# Patient Record
Sex: MALE
[De-identification: names, ages, dates, MRNs, and addresses within clinical notes are randomized per-mention and may not be internally consistent; named-entity substitution may affect disease eponyms.]

---

## 2021-01-23 ENCOUNTER — HOSPITAL ENCOUNTER (OUTPATIENT)
Dept: HOSPITAL 62 - EMPHEALTH | Age: 41
End: 2021-01-23
Attending: INTERNAL MEDICINE
Payer: SELF-PAY

## 2021-01-23 DIAGNOSIS — Z23: Primary | ICD-10-CM

## 2021-01-23 PROCEDURE — 91300: CPT

## 2021-01-26 NOTE — XMS REPORT
Patient Summary Document

                           Created on:2021



Patient:AMANDA BOJORQUEZ

Sex:Male

:1980

External Reference #:[id number]





Demographics







                          Address                   [address] [place] [address]

 

                          Home Phone                [phone]

 

                          Work Phone                [phone]

 

                          Email Address             [email or web address]

 

                          Preferred Language        English

 

                          Marital Status            Unknown

 

                          Yazdanism Affiliation     Unknown

 

                          Race                      Unknown

 

                          Additional Race(s)        [id number]

 

                          Ethnic Group              Unknown









Author







                          Organization              [facility]

 

                          Address                   [facility] [address] [place] [id number]

 

                          Phone                     [phone]









Care Team Providers







                    Name                Role                Phone

 

                    MALDONADO, NIA   Primary Care Physician Unavailable









Allergies, Adverse Reactions, Alerts

This patient has no known allergies or adverse reactions.



Medications







       Ordered Filled Start  Stop   Current Ordering Indication Dosage Frequency

 Signature

                          Comments                  Components



      Medication Medication Date  Date  Medication? Clinician                   

(SIG)       



      Name  Name                                                        

 

      lisinopriL              Yes         Hypertensio 10mg        Take 1 T

jade 1 



      (PRINIVIL,Z       8-19                    n,                tablet (10 tab

let 



      ESTRIL) 10       00:00:                   unspecified             mg total

) (10 mg 



      MG tablet       00                      type              by mouth total) 

by 



                                                            daily. mouth 



                                                                  daily. 

 

      lisinopril       2020- No          Hypertensio 10mg        Take 1 T

jade 1 



      (PRINIVIL,Z       8-29  08-19             n,                tablet (10 tab

let 



      ESTRIL) 10       00:00: 00:00             unspecified             mg total

) (10 mg 



      MG tablet       00    :00               type              by mouth total) 

by 



                                                            daily. mouth 



                                                                  daily. 

 

      lisinopril        2019- No                10mg        Take 10 mg Keegan

e 10 



      (PRINIVIL,Z       8-16  08-29                               by mouth mg by

 



      ESTRIL) 10       15:20: 00:00                               daily. mouth 



      MG tablet       35    :00                                       daily. 

 

      HYDROcodone       - 2017- No                1{tbl} Q4H   Take 1-2 Keegan

e 1-2 



      -acetaminop       1-19  02-18                               tablets by tab

lets 



      hen (NORCO)       00:00: 23:59                               mouth by mout

h 



      5-325 mg       00    :00                                 every four every 



      per tablet                                                 (4) hours four 

(4) 



                                                            as needed hours as 



                                                            for pain. needed 



                                                                  for pain. 

 

      HYDROcodone       - 2017- No                1{tbl} Q4H   Take 1-2 Keegan

e 1-2 



      -acetaminop       1-19  02-18                               tablets by tab

lets 



      hen (NORCO)       00:00: 23:59                               mouth by mout

h 



      5-325 mg       00    :00                                 every four every 



      per tablet                                                 (4) hours four 

(4) 



                                                            as needed hours as 



                                                            for pain. needed 



                                                                  for pain. 

 

      HYDROcodone       2017- No                1{tbl} Q4H   Take 1-2 Keegan

e 1-2 



      -acetaminop                                      tablets by tab

lets 



      hen (NORCO)       00:00: 23:59                               mouth by mout

h 



      5-325 mg       00    :00                                 every four every 



      per tablet                                                 (4) hours four 

(4) 



                                                            as needed hours as 



                                                            for pain. needed 



                                                                  for pain. 

 

      sulfamethox       2017- No                160mg       Take 1 Take 1

 



      azole-trime                          {trimet       tablet table

t 



      thoprim       00:00: 23:59                   hoprim}       (160 mg of (160

 mg 



      (BACTRIM       00    :00                                 trimethopr of    



      DS) 800-160                                                 im total) trim

ethop 



      mg per                                                 by mouth rim   



      tablet                                                 Two (2) total) by 



                                                            times a mouth Two 



                                                            day. for (2) times 



                                                            10 days a day. 



                                                                  for 10 



                                                                  days  

 

      sulfamethox       2017- No                160mg       Take 1 Take 1

 



      azole-trime                          {trimet       tablet table

t 



      thoprim       00:00: 23:59                   hoprim}       (160 mg of (160

 mg 



      (BACTRIM       00    :00                                 trimethopr of    



      DS) 800-160                                                 im total) trim

ethop 



      mg per                                                 by mouth rim   



      tablet                                                 Two (2) total) by 



                                                            times a mouth Two 



                                                            day. for (2) times 



                                                            10 days a day. 



                                                                  for 10 



                                                                  days  

 

      sulfamethox       2017- No                160mg       Take 1 Take 1

 



      azole-trime                          {trimet       tablet table

t 



      thoprim       00:00: 23:59                   hoprim}       (160 mg of (160

 mg 



      (BACTRIM       00    :00                                 trimethopr of    



      DS) 800-160                                                 im total) trim

ethop 



      mg per                                                 by mouth rim   



      tablet                                                 Two (2) total) by 



                                                            times a mouth Two 



                                                            day. for (2) times 



                                                            10 days a day. 



                                                                  for 10 



                                                                  days  







Problems







        Condition Condition Condition Status  Onset   Resolution Last    Treatin

g Comments



        Name    Details Category         Date    Date    Treatment Clinician 



                                                        Date            

 

        No known No known [id number]                                         



        active  active                                                  



        problems problems                                                 

 

        Essential Essential [id number] Active                  2019         



        hypertensio hypertensio                                 10:56:59        

 



        n       n                                                       







Procedures







                Procedure       Date / Time Performed Performing Clinician Devic e

 

                COMPREHENSIVE METABOLIC PANEL 2020 13:21:00 Tom Maldonado

ine 

 

                LIPID PANEL     2020 13:21:00 Nia Maldonado 

 

                LIPID PANEL     2019 00:00:00 Provider, Historical 

 

                POTASSIUM       2019 00:00:00 Provider, Historical 

 

                CREATININE      2019 00:00:00 Provider, Historical 

 

                INCISION AND DRAINAGE 2017 18:36:36                 







Results







           Test Description Test Time  Test Comments Text Results Atomic Results

 Result 

Comments









                SARS-CoV-2 RNA Nose Ql ANNA+probe 2020 00:00:00            

     









                          Test Item    Value        Reference Range Comments









                SARS-CoV-2 RNA Nose Ql ANNA+probe Not detected                   

 [place] [facility] Case ID:



                (test code = 00826-3)                                 [id number]

[id number]



SARS-CoV-2 RNA Nose Ql ANNA+[id number] 00:00:00





                Test Item       Value           Reference Range Comments

 

                SARS-CoV-2 RNA Nose Ql Not detected                    [place] [facility] Case



                ANNA+probe (test code =                                 ID: COVID

[id number]



                03703-8)                                        



Comprehensive metabolic panel (2020  1:21 PM EDT)2020 13:21:00





                Test Item       Value           Reference Range Comments

 

                Sodium (test code = Sodium) 138 mmol/L      136 - 145 mmol/L 

 

                Potassium (test code = Potassium) 4.2 mmol/L      3.5 - 5.1 mmol

/L 

 

                Chloride (test code = Chloride) 99 mmol/L       98 - 107 mmol/L 

 

                Anion Gap (test code = Anion Gap) 12 mmol/L       3 - 11 mmol/L 

  

 

                CO2 (test code = CO2) 27.0 mmol/L     20.0 - 31.0 mmol/L 

 

                BUN (test code = BUN) 17 mg/dL        9 - 23 mg/dL    

 

                Creatinine (test code = Creatinine) 0.87 mg/dL      0.60 - 1.10 

mg/dL 

 

                BUN/Creatinine Ratio (test code = 20                            

  



                BUN/Creatinine Ratio)                                 

 

                EGFR CKD-EPI Non-, Male (test >90               

              



                code = EGFR CKD-EPI Non-,                       

          



                Male)                                           

 

                EGFR CKD-EPI , Male (test >90                   

          



                code = EGFR CKD-EPI , Male)                     

            

 

                Glucose (test code = Glucose) 108 mg/dL       70 - 179 mg/dL  

 

                Calcium (test code = Calcium) 10.2 mg/dL      8.7 - 10.4 mg/dL 

 

                Albumin (test code = Albumin) 4.3 g/dL        3.4 - 5.0 g/dL  

 

                Total Protein (test code = Total Protein) 8.1 g/dL        5.7 - 

8.2 g/dL  

 

                Total Bilirubin (test code = Total Bilirubin) 0.5 mg/dL       0.

3 - 1.2 mg/dL 

 

                AST (test code = AST) 33 U/L          <34 U/L         

 

                ALT (test code = ALT) 38 U/L          10 - 49 U/L     

 

                Alkaline Phosphatase (test code = Alkaline 117 U/L         46 - 

116 U/L    



                Phosphatase)                                    



Lipid Panel (2020  1:21 PM EDT)2020 13:21:00





                Test Item       Value           Reference Range Comments

 

                Triglycerides (test code = Triglycerides) 97 mg/dL        0 - 15

0 mg/dL   

 

                Cholesterol (test code = Cholesterol) 185 mg/dL       <=200 mg/d

L     

 

                HDL (test code = HDL) 34 mg/dL        40 - 60 mg/dL   

 

                LDL Calculated (test code = LDL Calculated) 132 mg/dL       40 -

 100 mg/dL  

 

                VLDL Cholesterol Dwaine (test code = VLDL 19.4 mg/dL      10 - 50 m

g/dL   



                Cholesterol Dwaine)                                 

 

                Chol/HDL Ratio (test code = Chol/HDL Ratio) 5.4             1.0-

4.5         

 

                Non-HDL Cholesterol (test code = Non- mg/dL       70 - 13

0 mg/dL  



                Cholesterol)                                    

 

                FASTING (test code = FASTING) No                              



#[id number] 00:00:00





                Test Item       Value           Reference Range Comments

 

                Creatinine (test code = Creatinine) 1.00 mg/dL                  

    



#[id number] 00:00:00





                Test Item       Value           Reference Range Comments

 

                Potassium (test code = Potassium) 4.7 mmol/L                    

  



#[id number] 00:00:00





                Test Item       Value           Reference Range Comments

 

                Cholesterol (test code = Cholesterol) 155 mg/dL                 

      

 

                Triglycerides (test code = Triglycerides) 82 mg/dL              

          

 

                HDL (test code = HDL) 39 mg/dL                        

 

                LDL Calculated (test code = LDL Calculated) 99 mg/dL            

            

 

                Non-HDL Cholesterol (test code = Non-HDL                        

         



                Cholesterol)                                    

 

                Chol/HDL Ratio (test code = Chol/HDL Ratio) 3.9                 

            



INCISION AND [id number] 18:36:36INCISION AND DRAINAGE (2017 6:36 
PM) Narrative Mikhail Harrell MD 20176:36 PM ED Procedure
Note Incision/Drainage Date/Time: 2017 6:35 PM Performed by: MIKHAIL HARRELL Authorized by: MIKHAIL HARRELL Consent: Consent 
obtained:Verbal and written Consent given by:Patient Risks 
discussed:Bleeding and incomplete drainage Location: Type:Abscess 
Location:Head Head location:Scalp Pre-procedure details: 
Skin preparation:Hibiclens Anesthesia (see MAR for exact dosages): 
Anesthesia method:Local infiltration Local anesthetic:Lidocaine 
2% WITH epi Procedure details: Complexity:Simple Incision 
types:Cruciate Scalpel blade:11 Wound management:Probed and 
deloculated Drainage:Purulent Drainage amount:Moderate Wound
treatment:Wound left open Packing materials:None Post-procedure 
details: Patient tolerance of procedure:Tolerated well, no immediate 
complications



Encounters







        Start   End     Encounter Admission Attending Care    Care    Encounter 

ID



        Date/Time Date/Time Type    Type    Clinicians Facility Department 

 

        2020 Outpatient EL              [facility]     [id number]

[id number]



        12:56:20 23:59:00                                         [id number]



                                                                0

 

        2020 Outpatient                 [facility]  [id number]

[id number]



        12:56:20 13:16:20                                         

 

        [facility]     [id number]

[id number]



        00:00:00 00:00:00                                         [id number]

 

        2019 Outpatient                 [facility]  [id number]

[id number]



        00:00:00 00:00:00                                         

 

        [facility]     [id number]

[id number]



        11:03:41 11:32:43                                         [id number]



                                                                1

 

        2019 Outpatient                 [facility]  [id number]

[id number]



        11:03:41 11:32:43                                         

 

        [facility]     [id number]

[id number]



        00:00:00 00:00:00                                         [id number]

 

        2019 Outpatient                 [facility]  [id number]

[id number]



        00:00:00 00:00:00                                         

 

        2019 Outpatient                 [facility]  [id number]

[id number]



        00:00:00 00:00:00                                         

 

        2017 Outpatient                 [facility]  [id number]

[id number]



        17:13:33 17:45:00                                         

 

        2017 Outpatient                 [facility]  [id number]

[id number]



        17:47:39 18:40:00                                         







Immunizations







           Ordered Immunization Filled Immunization Date       Status     Commen

ts   Refusal Reason



           Name       Name                                        

 

           Influenza Virus            2018-10-01 Completed             



           Vaccine, unspecified            00:00:00                         



           formulation                                             

 

           TdaP                  2008-10-23 Completed             



                                 00:00:00                         







Payers







             Payer Name   Policy Type  Policy Number Effective Date Expiration D

ate

 

             [facility])              [id number]    2014 00:00:00 







Plan of Treatment







                Planned Activity Planned Date    Details         Comments

 

                Future Scheduled Test                  [code = ]      

 

                Future Scheduled Test                  [code = ]      

 

                Future Scheduled Test                  [code = ]      

 

                Future Scheduled Test                  [code = ]      

 

                Future Scheduled Test                  [code = ]      







Social History







                Social Habit    Start Date      Stop Date       Comments

 

                Tobacco smoking status NHIS 2020 00:00:00 2020 00:00

:00 

 

                Alcohol intake  2020 00:00:00 2020 00:00:00 

 

                Tobacco use and exposure 2020 00:00:00 2020 00:00:00

 

 

                History SDOH Social Connections 2020 00:00:00 2020 0

0:00:00 



                Meetings                                        

 

                History SDOH Social Connections 2020 00:00:00 2020 0

0:00:00 



                Living                                          

 

                History SDOH Physical Activity DPW 2020 00:00:00 2020

9 00:00:00 

 

                History SDOH Physical Activity MPS 2020 00:00:00 2020

9 00:00:00 

 

                History SDOH Stress 2020 00:00:00 2020 00:00:00 

 

                History SDOH Financial 2020 00:00:00 2020 00:00:00 

 

                History SDOH IPV Fear 2020 00:00:00 2020 00:00:00 

 

                History SDOH IPV Emotional 2020 00:00:00 2020 00:00:

00 

 

                History SDOH IPV Physical Abuse 2020 00:00:00 2020 0

0:00:00 

 

                History SDOH IPV Sexual Abuse 2020 00:00:00 2020 00:

00:00 

 

                History SDOH Food Worry 2020 00:00:00 2020 00:00:00 

 

                History SDOH Food Scarcity 2020 00:00:00 2020 00:00:

00 

 

                History SDOH Transport Med 2020 00:00:00 2020 00:00:

00 

 

                History SDOH Transport Non-Med 2020 00:00:00 2020 00

:00:00 

 

                History SDOH Social Connections 2020 00:00:00 2020 0

0:00:00 



                Phone                                           

 

                History SDOH Social Connections Get 2020 00:00:00  00:00:00 



                Together                                        

 

                History SDOH Social Connections 2020 00:00:00 2020 0

0:00:00 



                Gnosticist                                          

 

                History SDOH Social Connections 2020 00:00:00 2020 0

0:00:00 



                Membership                                      

 

                ASSERTION       2017 00:00:00 2017 00:00:00 







Vital Signs







                Vital Name      Observation Time Observation Value Comments

 

                Systolic blood pressure 2020 12:54:00 116 mm[Hg]      

 

                Diastolic blood pressure 2020 12:54:00 82 mm[Hg]       

 

                Heart rate      2020 12:54:00 95 /min         

 

                Body temperature 2020 12:54:00 36.22 Meera       

 

                Body weight     2020 12:54:00 107.502 kg      

 

                Oxygen saturation in Arterial blood by 2020 12:54:00 97 % 

           



                Pulse oximetry                                  

 

                Systolic blood pressure 2019 11:13:00 120 mm[Hg]      

 

                Diastolic blood pressure 2019 11:13:00 92 mm[Hg]       

 

                Heart rate      2019 11:13:00 81 /min         

 

                Body temperature 2019 11:13:00 36.28 Meera       

 

                Respiratory rate 2019 11:13:00 18 /min         

 

                Body height     2019 11:13:00 190.5 cm        

 

                Body weight     2019 11:13:00 105.96 kg       

 

                Oxygen saturation in Arterial blood by 2019 11:13:00 97 % 

           



                Pulse oximetry                                  

 

                BODY TEMPERATURE 2017 17:15:00 36.78 Meera       

 

                HEIGHT          2017 17:13:00 188 cm          

 

                WEIGHT          2017 17:13:00 109.589 kg      

 

                SYSTOLIC BLOOD PRESSURE 2017 18:39:00 142 mm[Hg]      

 

                DIASTOLIC BLOOD PRESSURE 2017 18:39:00 92 mm[Hg]       

 

                HEART RATE      2017 18:39:00 77 /min         

 

                RESPIRATORY RATE 2017 18:39:00 16 /min         

 

                BODY TEMPERATURE 2017 17:51:00 36.94 Meera       

 

                HEIGHT          2017 17:51:00 190.5 cm        

 

                WEIGHT          2017 17:51:00 110.224 kg      

 

                OXYGEN SATURATION 2017 17:51:00 97 %